# Patient Record
Sex: FEMALE | Race: WHITE
[De-identification: names, ages, dates, MRNs, and addresses within clinical notes are randomized per-mention and may not be internally consistent; named-entity substitution may affect disease eponyms.]

---

## 2017-02-10 ENCOUNTER — HOSPITAL ENCOUNTER (EMERGENCY)
Dept: HOSPITAL 68 - ERH | Age: 52
End: 2017-02-10
Payer: COMMERCIAL

## 2017-02-10 VITALS — WEIGHT: 131 LBS | BODY MASS INDEX: 24.11 KG/M2 | HEIGHT: 62 IN

## 2017-02-10 VITALS — SYSTOLIC BLOOD PRESSURE: 160 MMHG | DIASTOLIC BLOOD PRESSURE: 80 MMHG

## 2017-02-10 DIAGNOSIS — S61.512A: Primary | ICD-10-CM
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
## 2017-02-10 NOTE — ED ANIMAL BITE/WOUND CHECK
History of Present Illness
 
General
Chief Complaint: Animal/Insect Bite
Stated Complaint: DOG BIT TO LEFT WRIST AND LIP
Source: patient
Exam Limitations: no limitations
 
Vital Signs & Intake/Output
Vital Signs & Intake/Output
 Vital Signs
 
 
Date Time Temp Pulse Resp B/P Pulse O2 O2 Flow FiO2
 
     Ox Delivery Rate 
 
02/10 1625    180/90    
 
02/10 1620 99.3 78 20 193/113 97 Room Air  
 
 
Allergies
Coded Allergies:
venom-honey bee (Severe, THROAT CLOGS UP, LOCALIZED SWELLING 02/10/17)
 
Reconcile Medications
Amoxicillin/Potassium Clav (Augmentin 875-125 Tablet) 875 MG-125 MG TABLET   1 
TAB PO BID DOG BITE
 
Triage Note:
TRIAGE: PT TO ER C/C DOG BITE TO L WRIST S/P BIT
 BY DOG THAT SHE WAS GOING TO FOSTER. DOG UP TO
 DATE ON SHOTS PER RESCUE ORGANIZATION BUT PATIENT
 DOESN'T HAVE ANY PAPERWORK.
Triage Nurses Notes Reviewed? yes
Onset: Just prior to arrival
Duration: hour(s): (1)
Timing: no prior history
Injury Environment: home
Is Injury an Animal Bite? Yes
Animal Type: dog
Context of Animal Attack: unprovoked attack
Appearance of Animal: appeared well
Animal Immunization Status: up to date
Observation/Capture: animal known/obs x10 days
Severity of Attack: bitten
Severity: moderate
Severity Numbers: 8
Modifying Factors:
Improves With: immobilization.  Worsens With: movement. 
HPI:
Patient is a 52-year-old female presenting to the emergency Department chief 
complaint of left wrist pain and laceration after getting bitten by dog.  She 
reports that she was going to foster the dog but it bit her.  The dog is up-to-
date with immunizations.  She is unsure of tetanus immunization.  Pain is 
moderate to severe worse with movement and palpation.  Denies any numbness or 
tingling.  She also noticed some swelling in the area.  Denies nausea or 
vomiting fevers or chills chest pain or shortness of breath.  Denies taking 
anything for pain prior to arrival.  She was given Tylenol at triage.
(YUKO CURRY)
 
Past History
 
Travel History
Traveled to [place] past 21 day No
 
Medical History
Any Pertinent Medical History? see below for history
Neurological: NONE
EENT: NONE
Cardiovascular: hypertension
Respiratory: NONE
Gastrointestinal: NONE
Hepatic: NONE
Renal: NONE
Musculoskeletal: disk herniation
Psychiatric: NONE
Endocrine: NONE
Blood Disorders: NONE
Cancer(s): NONE
GYN/Reproductive: NONE
 
Surgical History
Surgical History: non-contributory
 
Psychosocial History
Who do you live with Spouse
What is your primary language English
Tobacco Use: Quit >30 days ago
ETOH Use: occasional use
Illicit Drug Use: denies illicit drug use
 
Family History
Hx Contributory? No
(YUKO CURRY)
 
Review of Systems
 
Review of Systems
Constitutional:
Reports: no symptoms. 
Comments
Review of systems: See HPI, All other systems negative.
Constitutional, no chills fever or weight loss
HEENT: No visual changes no sore throat no congestion
Cardiovascular: No chest pain 
Skin, no jaundice 
Respiratory: No dyspnea cough sputum or hemoptysis
GI: No nausea no vomiting
: No dysuria No hematuria
Muscle skeletal: no back pain, no neck pain,
Neurologic: No numbness no confusion
Psych: No stress anxiety 
Immunology: No splenectomy or history of AIDS
(YUKO CURRY)
 
Physical Exam
 
Physical Exam
General Appearance: well developed/nourished, no apparent distress, alert, awake
, comfortable
Comments:
Well-developed well-nourished person in no acute distress
HEENT: Pupils equally round and reactive to light and accommodation. Nose is 
atraumatic.  No sinus tenderness to palpation.  Nasal discharge is clear 
bilaterally.
Neck: Normal inspection
Back: Nontender
Cardiovascular: Regular rate and rhythms no murmurs rubs or gallops, normal JVP
Respiratory:  No respiratory distress.breath sounds clear to auscultation 
bilaterally
Extremity: Mild edema noted over the left wrist, limited range of motion 
secondary to pain.  Radial pulses are 2+ bilaterally.  Capillary refill is 
intact in upper extremities bilaterally.  No foreign bodies appreciated in the 
laceration.
Neuro: Alert oriented x3, motor sensory normal
Skin: see extremity exam.
Psych: Mood and affect is normal, memory and judgment is normal.
(YUKO CURRY)
 
Progress
Differential Diagnosis: cellulitis, tenosysnovitis, LACERATION, ABRASION, 
CONTUSION, FOREIGN BODY IN SOFT TISSUE
Plan of Care:
 Orders
 
 
Procedure Date/time Status
 
XRY-WRIST COMPLETE-LEFT 02/10 1714 Active
 
 
Diagnostic Imaging:
Viewed by Me: Radiology Read. 
Radiology Impression: no acute abnormality, no fracture, no dislocation, no 
foreign body seen
Comments:
Given by mouth Vicodin on arrival for pain.  Also given tetanus immunization.  
Patient wound was irrigated extensively with Betadine prep.  2 sutures placed 
loosely.  She'll return in 7-10 days for suture removal or return sooner for 
worsening symptoms or concerns.  We will recheck blood pressure manually.  
Patient wasn't on a pAIN on arrival.
(YUKO CURRY)
 
Departure
 
Departure
Time of Disposition: 1742
Disposition: HOME OR SELF CARE
Condition: Stable
Clinical Impression
Primary Impression: Dog bite
Qualifiers:  Encounter type: initial encounter Qualified Code: W54.0XXA - Bitten
by dog, initial encounter
Secondary Impressions: 
Hypertension
Qualifiers:  Hypertension type: essential hypertension Qualified Code: I10 - 
Essential (primary) hypertension
 
Laceration
 
Referrals:
NASEEM ELAINE MD (PCP/Family)
 
Additional Instructions:
Return in 7-10 days for suture removal.  Return sooner if there is any increased
redness pain swelling or discharge.  Return if there is any fevers.  Take 
previously prescribed pain medication for pain..  Keep wound clean and dry.
Departure Forms:
Customer Survey
General Discharge Information
Prescriptions:
Current Visit Scripts
Amoxicillin/Potassium Clav (Augmentin 875-125 Tablet) 1 TAB PO BID 
     #20 TAB 
 
 
(YUKO CURRY)
 
PA/NP Co-Sign Statement
Statement:
ED Attending supervision documentation-
 
[] I saw and evaluated the patient. I have also reviewed all the pertinent lab 
results and diagnostic results. I agree with the findings and the plan of care 
as documented in the PA's/NP's documentation. 
 
[X] I have reviewed the ED Record and agree with the PA's/NP's documentation.
 
[] Additions or exceptions (if any) to the PAs/NP's note and plan are 
summarized below:
[]
 
(BALDO ORDONEZ,FARIBA)
 
Procedures
 
Laceration/Wound Repair
Laceration/Wound Repair:
   Wound Location: upper extremity
   Wound's Depth, Shape: linear, subcutaneous
   Wound Length (cm): 3
   Wound Explored: clean, no foreign body removed, irrigated extensively
   Irrigated w/ Saline (ccs): 2000
   Betadine Prep? Yes
   Anesthesia: 1% lidocaine
   Volume Anesthetic (ccs): 4
   Wound Debrided: minimal
   Wound Repaired With: sutures
   Suture Size/Type: 5:0, nylon
   Number of Sutures: 2
   Layer Closure? No
   Date of Last Tetanus: 02/10/17
   Tetanus Status: up to date
Progress:
Patient tolerated procedure well.
(YUKO CURRY)

## 2017-02-10 NOTE — RADIOLOGY REPORT
EXAMINATION:
WRIST 4 VIEWS, LEFT
 
CLINICAL INFORMATION:
Dog bite. Concern for foreign body.
 
COMPARISON:
None.
 
TECHNIQUE:
AP, lateral, oblique, scaphoid views of the left wrist are provided.
 
FINDINGS:
There are no fractures or dislocations. There is no displacement of the
pronator fat pad. The proximal carpal row is intact. There is subcutaneous
gas along the dorsal aspect of the wrist indicative of penetrating injury.
There are no radiopaque foreign bodies.
 
IMPRESSION:
 
No radiopaque foreign bodies. Soft tissue injury without evidence of osseous
injury.

## 2018-07-23 ENCOUNTER — HOSPITAL ENCOUNTER (EMERGENCY)
Dept: HOSPITAL 68 - ERH | Age: 53
End: 2018-07-23
Payer: COMMERCIAL

## 2018-07-23 VITALS — DIASTOLIC BLOOD PRESSURE: 75 MMHG | SYSTOLIC BLOOD PRESSURE: 142 MMHG

## 2018-07-23 VITALS — BODY MASS INDEX: 23.92 KG/M2 | WEIGHT: 130 LBS | HEIGHT: 62 IN
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
## 2018-07-23 DIAGNOSIS — E23.6: ICD-10-CM

## 2018-07-23 DIAGNOSIS — R51: ICD-10-CM
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
## 2018-07-23 DIAGNOSIS — S09.90XA: ICD-10-CM

## 2018-07-23 DIAGNOSIS — T14.8XXA: Primary | ICD-10-CM
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
## 2018-07-23 NOTE — ED MVC/FALL/TRAUMA COMPLAINT
History of Present Illness
 
General
Chief Complaint: Fall
Stated Complaint: FALL/HEAD STRIKE -LOC
Source: patient, family
Exam Limitations: no limitations
 
Vital Signs & Intake/Output
Vital Signs & Intake/Output
 Vital Signs
 
 
Date Time Temp Pulse Resp B/P B/P Pulse O2 O2 Flow FiO2
 
     Mean Ox Delivery Rate 
 
 1736 98.9 90 18 142/75  99 Room Air  
 
 1444 97.2 88 16 160/104  99   
 
 
 ED Intake and Output
 
 
  0000  1200
 
Intake Total  
 
Output Total  
 
Balance  
 
   
 
Patient 130 lb 
 
Weight  
 
Weight Reported by Patient 
 
Measurement  
 
Method  
 
 
 
Allergies
Coded Allergies:
venom-honey bee (Severe, THROAT CLOGS UP, LOCALIZED SWELLING 02/10/17)
 
Reconcile Medications
Amoxicillin/Potassium Clav (Augmentin 875-125 Tablet) 875 MG-125 MG TABLET   1 
TAB PO BID DOG BITE
Meloxicam (Mobic) 15 MG TABLET   1 TAB PO DAILY PRN PAIN
 
Triage Note:
52 Y/O FEMALE PRESENTS S/P FALL OFF HORSE APPROX 1
HOUR AGO.  PT STATES HORSE BROKE OUT INTO A
CANTER, "THE HORSE WENT ONE WAY AND I WENT THE
OTHER INTO A WALL".  PT WAS WEARING HELMET.
DENIES LOC WITH FALL, GOT SELF UP AND SHOWERED
BEFORE COMING TO ED.  PT C/O PAIN TO L SIDED FACE,
EPISTAXIS AT TIME OF INJURY, PAIN TO R HAND/WRIST
AND L BASE OF HAND.  SPLINT PLACED TO R WRIST.  PT
OFFERED MEDS IN TRIAGE BUT DECLINED STATING "I
TOOK A VICODIN IN THE HOUR".  AMBULATORY WITH
STEADY GAIT NOTED
Triage Nurses Notes Reviewed? yes
HPI:
54yo female presents to ED complaining of fall from horse about 1 hour prior to 
arrival.  Patient said she was riding on the horse about 6 feet off the ground 
when the horse hit a wall on the patient's right side and then the patient fell 
down onto the ground.  Patient reports landing on mainly her right hand however 
she did hit her face and left hand.  Patient reports epistaxis from right 
nostril at the time of the injury which was well-controlled with pressure, no 
active bleeding.  Patient has been is concerned because she has previous 
cervical fusion and hardware in her neck.  Patient was able to drive home 
following the fall, she showered and had increasing pain and stiffness.  She 
took Vicodin which she had at home however her pain is still increasing.  
Patient reports mild headache, pain in right hand, mild pain in left hand.  She 
denies loss of consciousness, visual changes, abdominal pain, pleuritic pain, 
dyspnea.
(Barbara Murphy)
 
Past History
 
Travel History
Traveled to [place] past 21 day No
 
Medical History
Any Pertinent Medical History? see below for history
Neurological: NONE
EENT: NONE
Cardiovascular: hypertension
Respiratory: NONE
Gastrointestinal: NONE
Hepatic: NONE
Renal: NONE
Musculoskeletal: disk herniation
Psychiatric: NONE
Endocrine: NONE
Blood Disorders: NONE
Cancer(s): NONE
GYN/Reproductive: NONE
Tetanus Vaccine: 02/10/17
 
Surgical History
Surgical History: non-contributory
 
Psychosocial History
Who do you live with Spouse
What is your primary language English
Tobacco Use: Quit >30 days ago
 
Family History
Hx Contributory? No
(Barbara Murphy)
 
Review of Systems
 
Review of Systems
Constitutional:
Reports: no symptoms. 
Eyes:
Reports: no symptoms. 
Ears, Nose, Throat, Mouth:
Reports: see HPI. 
Respiratory:
Reports: no symptoms. 
Cardiovascular:
Reports: no symptoms. 
Gastrointestinal/Abdominal:
Reports: no symptoms. 
Genitourinary:
Reports: no symptoms. 
Musculoskeletal:
Reports: see HPI. 
Skin:
Reports: no symptoms. 
Neurological/Psychological:
Reports: see HPI. 
All Other Systems: Reviewed and Negative
(Barbara Murphy)
 
Physical Exam
 
Physical Exam
General Appearance: well developed/nourished, no apparent distress, alert, awake
Head: tenderness with mild swelling centrally to forehead at brow line, no nasal
bone tenderness or active bleeding
Eyes:
Bilateral: normal appearance. 
Ears, Nose, Throat, Mouth: hearing grossly normal, moist mucous membrane, 
Tympanic normal, no hemotypanum, no septal hematoma
Neck: normal inspection, supple, full range of motion, no midline tenderness
Respiratory: normal breath sounds, no respiratory distress, lungs clear
Cardiovascular: regular rate/rhythm, normal peripheral pulses
Peripheral Pulses:
2+ radial (R), 2+ radial (L)
Gastrointestinal: normal bowel sounds, soft, non-tender, no organomegaly
Back: normal inspection, normal range of motion, no vertebral tenderness
Extremities: normal range of motion, Right hand: tenderness, swelling, and 
ecchymosis to thenar component of hand, ROM intact, Left hand: tenderness and 
mild swelling of index finger
Neurologic/Psych: no motor/sensory deficits, awake, alert, oriented x 3, CNs II-
XII nml as tested
Skin: normal color, warm/dry, ecchymosis
 
Core Measures
ACS in differential dx? No
CVA/TIA Diagnosis No
Sepsis Present: No
Sepsis Focused Exam Completed? No
(Kelly REAL,Barbara Eastman)
 
Progress
Differential Diagnosis: abd injury, C/T/L spine injury, ext injury, ICH, spinal 
cord injury
Plan of Care:
 Orders
 
 
Procedure Date/time Status
 
Durable Medical Equipment 1741 Active
 
 
Spoke with radiology regarding this patient's head CT scan - incidental finding 
of enlarged pituitary gland, follow up is recommended.  I discussed these 
findings with the patient to station specialist has been following her for 
enlarged pituitary, these findings may be stable.  She was given printed copy of
radiology report and will follow-up with specialist for further evaluation.
 
No other acute abnormality, no fractures.  Patient educated on RICE therapy. She
is ambulatory here in the emergency department without difficulty.  She answers 
questions readily, no focal neurologic deficit.  The patient agrees with plan of
care.
Diagnostic Imaging:
Viewed by Me: Radiology Read, CT Scan.  Discussed w/RAD: Radiology Read, CT 
Scan. 
Radiology Impression: PATIENT: MANISHA HELMS  MEDICAL RECORD NO: [id number] 
PRESENT AGE: 53  PATIENT ACCOUNT NO: [id number] : 63  LOCATION: [facility] 
ORDERING PHYSICIAN: Barbara REAL     SERVICE DATE:  EXAM 
TYPE: RAD - XRY-HAND, LEFT EXAMINATION: XR HAND, LEFT CLINICAL INFORMATION: Rule
out fracture. COMPARISON: None TECHNIQUE: PA, lateral, and oblique views of the 
left hand. FINDINGS: No evidence of acute fracture or dislocation. Joint spaces 
are maintained. No erosions. No abnormal soft tissue calcification. IMPRESSION: 
No acute fracture seen. DICTATED BY: Evelio Peña MD  DATE/TIME DICTATED:1645 :RAD.VARGAS  DATE/TIME TRANSCRIBED:18 
CONFIDENTIAL, DO NOT COPY WITHOUT APPROPRIATE AUTHORIZATION.  <Electronically 
signed in Other Vendor System>                                                  
                                     SIGNED BY: Evelio Peña MD 18
, PATIENT: MANISHA HELMS  MEDICAL RECORD NO: [id number] PRESENT AGE: 53  
PATIENT ACCOUNT NO: [id number] : 61  LOCATION: [facility] ORDERING PHYSICIAN: 
Barbara REAL     SERVICE DATE:  EXAM TYPE: RAD - XRY-HAND, 
RIGHT EXAMINATION: XR HAND, RIGHT CLINICAL INFORMATION: Fall. Right hand pain. 
COMPARISON: None TECHNIQUE: Three views of the right hand. FINDINGS: There is no
fracture. There is no dislocation. There is degenerative joint narrowing with 
subchondral sclerosis of bone and small marginal spurs at the first metacarpal 
carpal joint. IMPRESSION: 1. No acute osseous abnormality. 2. Degenerative joint
disease of the first metacarpal carpal joint DICTATED BY: Philip Duval MD  DATE/
TIME DICTATED:18 :RAD.VARGAS  DATE/TIME TRANSCRIBED:
18 CONFIDENTIAL, DO NOT COPY WITHOUT APPROPRIATE AUTHORIZATION.  <
Electronically signed in Other Vendor System>                                   
                                                    SIGNED BY: Philip Duval MD  1644, PATIENT: MANISHA HELMS  MEDICAL RECORD NO: [id number] PRESENT AGE:
53  PATIENT ACCOUNT NO: [id number] : 61  LOCATION: [facility] ORDERING PHYSICIAN:
Barbara REAL     SERVICE DATE:  EXAM TYPE: CAT - CT CERV 
SPINE WO IV CONTRAST; CT HEAD WO IV CONTRAST; CT MAXILLOFACIAL W/O CON 
EXAMINATION: CT HEAD WITHOUT CONTRAST CT FACIAL BONES WITHOUT CONTRAST CT 
CERVICAL SPINE WITHOUT CONTRAST CLINICAL INFORMATION: Fall. COMPARISON: None. 
TECHNIQUE: Imaging was performed from the skull base to vertex without 
intravenous administration of contrast. In addition, helical noncontrast CT 
imaging was acquired through the cervical spine and facial bones and source 
images were reviewed along with axial reconstructions and sagittal and coronal 
MPRs. DLP: 1501.62 mGy-cm FINDINGS: HEAD: No intracranial mass, hemorrhage, or 
midline shift is visualized. The ventricles and sulci are age-appropriate. No 
extra-axial collections are identified. The pituitary is slightly prominent size
measuring 1.2 x 1.2 x 1 cm. The superior border is convex. The pituitary is 
slightly hyperdense but no calcification or fatty deposit present in the 
pituitary. There is no bone erosion of the adjacent clinoids. FACIAL BONES: 
There is no evidence of an acute facial bone fracture. The paranasal sinuses are
well aerated. The orbits are unremarkable in appearance. CERVICAL SPINE: There 
is no evidence of acute cervical spine fracture. Patient's had fusion with 
anterior orthopedic plate and screws with disc fusion C5-C7. The vertebrae have 
normal height and normal alignment. There is a small spur at the anterior 
inferior endplate of C4 - C5. There is a small ossification at the anterior 
longitudinal ligament at this disc level. There is bone fusion of the left C2-C3
facet joint. There is joint narrowing and marginal bone spurring of the right C3
-C4 facet joint. No pre- or paravertebral soft tissue abnormality is identified.
Limited assessment of the lung apices is unremarkable. IMPRESSION: 1. No acute 
intracranial process or discrete facial bone fracture. The pituitary gland is 
slightly hyperdense and slightly prominent in size. 2. No acute cervical spine 
fracture or traumatic subluxation. This critical result was discussed with ADDIE Mendoza on 2018, 5:10 PM and it was ascertained that the content and urgency 
of the report was understood at the time of direct communication. DICTATED BY: 
Philip Duval MD  DATE/TIME DICTATED:18 :RAD.VARGAS  
DATE/TIME TRANSCRIBED:18 CONFIDENTIAL, DO NOT COPY WITHOUT 
APPROPRIATE AUTHORIZATION.  <Electronically signed in Other Vendor System>      
                                                                                
SIGNED BY: Philip Duval MD 18 [id number]
(Kelly REAL,Barbara Eastman)
 
Departure
 
Departure
Disposition: HOME OR SELF CARE
Condition: Stable
Clinical Impression
Primary Impression: Fall
Qualifiers:  Encounter type: initial encounter Qualified Code: W19.XXXA - 
Unspecified fall, initial encounter
Secondary Impressions: Enlarged pituitary gland, Sprain
Referrals:
Caren Spence MD (PCP/Family)
 
Additional Instructions:
Follow up with your specialist regarding your enlarged pituitary gland. Continue
vicoden for pain. Take meloxicam for pain and swelling. Return with worsening 
symptoms or concerns.
 
Please note that there might be incidental findings in your evaluation that are 
unrelated to the current emergency department visit.  Please notify your primary
care doctor about this emergency department visit in order to obtain and review 
all of the testing performed so that these incidental findings can be monitored 
as needed.
 
If you had an x-ray performed, please understand that some fractures may not be 
seen on the initial set of x-rays.  If your symptoms persist you might need a 
repeat set of x-rays to check for such a fracture.
 
If you had a laceration evaluated, please understand that foreign bodies such as
glass or wood may not be visible to the naked eye or on plain x-rays.  If the 
wound becomes red, swollen, increasingly more painful or if there is any 
drainage from the wound, please have it reevaluated by a physician for the 
possibility of a retained foreign body.
 
If you're unable to follow up as outlined in the discharge instructions please 
return to the emergency department.
 
Thank you for choosing the [facility] Emergency Department for your care.
It was a pleasure to serve you today.
Departure Forms:
Customer Survey
General Discharge Information
Prescriptions:
Current Visit Scripts
Meloxicam (Mobic) 1 TAB PO DAILY PRN PAIN 
     #20 TAB 
 
 
(Kelly REAL,Barbara Eastman)
 
PA/NP Co-Sign Statement
Statement:
ED Attending supervision documentation-
 
[] I saw and evaluated the patient. I have also reviewed all the pertinent lab 
results and diagnostic results. I agree with the findings and the plan of care 
as documented in the PA's/NP's documentation. 
 
[X] I have reviewed the ED Record and agree with the PA's/NP's documentation.
 
[] Additions or exceptions (if any) to the PAs/NP's note and plan are 
summarized below:
[]
 
(Eric Rowe DO

## 2018-07-23 NOTE — CT SCAN REPORT
EXAMINATION:
CT HEAD WITHOUT CONTRAST
CT FACIAL BONES WITHOUT CONTRAST
CT CERVICAL SPINE WITHOUT CONTRAST
 
CLINICAL INFORMATION:
Fall.
 
COMPARISON:
None.
 
TECHNIQUE:
Imaging was performed from the skull base to vertex without intravenous
administration of contrast. In addition, helical noncontrast CT imaging was
acquired through the cervical spine and facial bones and source images were
reviewed along with axial reconstructions and sagittal and coronal MPRs.
 
DLP:
1501.62 mGy-cm
 
FINDINGS:
 
HEAD: No intracranial mass, hemorrhage, or midline shift is visualized. The
ventricles and sulci are age-appropriate. No extra-axial collections are
identified.
 
The pituitary is slightly prominent size measuring 1.2 x 1.2 x 1 cm. The
superior border is convex. The pituitary is slightly hyperdense but no
calcification or fatty deposit present in the pituitary. There is no bone
erosion of the adjacent clinoids.
 
FACIAL BONES: There is no evidence of an acute facial bone fracture. The
paranasal sinuses are well aerated. The orbits are unremarkable in
appearance.
 
CERVICAL SPINE: There is no evidence of acute cervical spine fracture.
Patient's had fusion with anterior orthopedic plate and screws with disc
fusion C5-C7.
The vertebrae have normal height and normal alignment.
There is a small spur at the anterior inferior endplate of C4 - C5. There is
a small ossification at the anterior longitudinal ligament at this disc
level. There is bone fusion of the left C2-C3 facet joint. There is joint
narrowing and marginal bone spurring of the right C3-C4 facet joint.
 
No pre- or paravertebral soft tissue abnormality is identified. Limited
assessment of the lung apices is unremarkable.
 
IMPRESSION:
1. No acute intracranial process or discrete facial bone fracture.
The pituitary gland is slightly hyperdense and slightly prominent in size.
2. No acute cervical spine fracture or traumatic subluxation.
 
This critical result was discussed with ADDIE Mendoza on 07/23/2018, 5:10 PM and it
was ascertained that the content and urgency of the report was understood at
the time of direct communication.

## 2018-07-23 NOTE — RADIOLOGY REPORT
EXAMINATION:
XR HAND, RIGHT
 
CLINICAL INFORMATION:
Fall. Right hand pain.
 
COMPARISON:
None
 
TECHNIQUE:
Three views of the right hand.
 
FINDINGS:
There is no fracture. There is no dislocation.
There is degenerative joint narrowing with subchondral sclerosis of bone and
small marginal spurs at the first metacarpal carpal joint.
 
IMPRESSION:
 
1. No acute osseous abnormality.
2. Degenerative joint disease of the first metacarpal carpal joint

## 2018-07-23 NOTE — RADIOLOGY REPORT
EXAMINATION:
XR HAND, LEFT
 
CLINICAL INFORMATION:
Rule out fracture.
 
COMPARISON:
None
 
TECHNIQUE:
PA, lateral, and oblique views of the left hand.
 
FINDINGS:
No evidence of acute fracture or dislocation. Joint spaces are maintained. No
erosions. No abnormal soft tissue calcification.
 
IMPRESSION:
No acute fracture seen.